# Patient Record
Sex: MALE | URBAN - METROPOLITAN AREA
[De-identification: names, ages, dates, MRNs, and addresses within clinical notes are randomized per-mention and may not be internally consistent; named-entity substitution may affect disease eponyms.]

---

## 2023-05-30 ENCOUNTER — APPOINTMENT (RX ONLY)
Dept: URBAN - METROPOLITAN AREA CLINIC 123 | Facility: CLINIC | Age: 43
Setting detail: DERMATOLOGY
End: 2023-05-30

## 2023-05-30 DIAGNOSIS — L82.1 OTHER SEBORRHEIC KERATOSIS: ICD-10-CM | Status: STABLE

## 2023-05-30 DIAGNOSIS — D17 BENIGN LIPOMATOUS NEOPLASM: ICD-10-CM | Status: INADEQUATELY CONTROLLED

## 2023-05-30 PROBLEM — D17.1 BENIGN LIPOMATOUS NEOPLASM OF SKIN AND SUBCUTANEOUS TISSUE OF TRUNK: Status: ACTIVE | Noted: 2023-05-30

## 2023-05-30 PROCEDURE — ? ADDITIONAL NOTES

## 2023-05-30 PROCEDURE — ? DEFER

## 2023-05-30 PROCEDURE — ? COUNSELING

## 2023-05-30 PROCEDURE — 99203 OFFICE O/P NEW LOW 30 MIN: CPT

## 2023-05-30 ASSESSMENT — LOCATION DETAILED DESCRIPTION DERM
LOCATION DETAILED: RIGHT LATERAL ABDOMEN
LOCATION DETAILED: RIGHT RIB CAGE

## 2023-05-30 ASSESSMENT — LOCATION ZONE DERM: LOCATION ZONE: TRUNK

## 2023-05-30 ASSESSMENT — LOCATION SIMPLE DESCRIPTION DERM: LOCATION SIMPLE: ABDOMEN

## 2023-05-30 NOTE — PROCEDURE: ADDITIONAL NOTES
Detail Level: Detailed
Additional Notes: Pt would like to pursue excision and will schedule 45 min appointment on my calendar at a future date.
Render Risk Assessment In Note?: no

## 2023-06-08 ENCOUNTER — APPOINTMENT (RX ONLY)
Dept: URBAN - METROPOLITAN AREA CLINIC 123 | Facility: CLINIC | Age: 43
Setting detail: DERMATOLOGY
End: 2023-06-08

## 2023-06-08 DIAGNOSIS — D17 BENIGN LIPOMATOUS NEOPLASM: ICD-10-CM

## 2023-06-08 PROBLEM — D48.5 NEOPLASM OF UNCERTAIN BEHAVIOR OF SKIN: Status: ACTIVE | Noted: 2023-06-08

## 2023-06-08 PROCEDURE — ? TREATMENT REGIMEN

## 2023-06-08 PROCEDURE — ? ADDITIONAL NOTES

## 2023-06-08 PROCEDURE — 11406 EXC TR-EXT B9+MARG >4.0 CM: CPT

## 2023-06-08 PROCEDURE — ? PRESCRIPTION

## 2023-06-08 PROCEDURE — 12032 INTMD RPR S/A/T/EXT 2.6-7.5: CPT

## 2023-06-08 PROCEDURE — ? EXCISION

## 2023-06-08 PROCEDURE — ? COUNSELING

## 2023-06-08 RX ORDER — DOXYCYCLINE HYCLATE 100 MG/1
CAPSULE, GELATIN COATED ORAL
Qty: 10 | Refills: 0 | Status: ERX | COMMUNITY
Start: 2023-06-08

## 2023-06-08 RX ADMIN — DOXYCYCLINE HYCLATE: 100 CAPSULE, GELATIN COATED ORAL at 00:00

## 2023-06-08 ASSESSMENT — LOCATION SIMPLE DESCRIPTION DERM: LOCATION SIMPLE: ABDOMEN

## 2023-06-08 ASSESSMENT — LOCATION ZONE DERM: LOCATION ZONE: TRUNK

## 2023-06-08 ASSESSMENT — LOCATION DETAILED DESCRIPTION DERM: LOCATION DETAILED: RIGHT LATERAL ABDOMEN

## 2023-06-08 NOTE — PROCEDURE: ADDITIONAL NOTES
Render Risk Assessment In Note?: no
Additional Notes: Upon removing the initially excised tissue, there was separation of the specimen from a deeper, somewhat distinct subcutaneous mass  that was clinically consistent with a lipoma.  Due to size, both this deeper tissue and the superficial tissue could not fit inside the same bottle, and therefore were sent in two bottles with the same label, as the site / diagnosis / ddx was the same.  \\n\\nPatient counseled on pain control with acetaminophen and ibuprofen.  Deeper layers did seem more fixed to muscular fascia so in case of recurrence, next excision may need to be done by Plastic / General Surgery under anesthesia but intent was to remove most / all today.\\n\\nPatient contacted later in the day / evening to check in and had started on abx for prophylaxis against infection and pain controlled with Tylenol.  He was doing well and denied questions or concerns.
Detail Level: Detailed

## 2023-06-08 NOTE — PROCEDURE: EXCISION

## 2023-06-09 ENCOUNTER — RX ONLY (OUTPATIENT)
Age: 43
Setting detail: RX ONLY
End: 2023-06-09

## 2023-06-09 ENCOUNTER — APPOINTMENT (RX ONLY)
Dept: URBAN - METROPOLITAN AREA CLINIC 123 | Facility: CLINIC | Age: 43
Setting detail: DERMATOLOGY
End: 2023-06-09

## 2023-06-09 DIAGNOSIS — Z48.817 ENCOUNTER FOR SURGICAL AFTERCARE FOLLOWING SURGERY ON THE SKIN AND SUBCUTANEOUS TISSUE: ICD-10-CM

## 2023-06-09 PROCEDURE — ? POST-OP WOUND CHECK

## 2023-06-09 PROCEDURE — ? ADDITIONAL NOTES

## 2023-06-09 RX ORDER — TRAMADOL HYDROCHLORIDE 50 MG/1
TABLET, FILM COATED ORAL
Qty: 4 | Refills: 0 | COMMUNITY
Start: 2023-06-09

## 2023-06-09 ASSESSMENT — LOCATION DETAILED DESCRIPTION DERM: LOCATION DETAILED: RIGHT LATERAL ABDOMEN

## 2023-06-09 ASSESSMENT — LOCATION SIMPLE DESCRIPTION DERM: LOCATION SIMPLE: ABDOMEN

## 2023-06-09 ASSESSMENT — LOCATION ZONE DERM: LOCATION ZONE: TRUNK

## 2023-06-09 NOTE — PROCEDURE: POST-OP WOUND CHECK
Detail Level: Detailed
Add 54953 Cpt? (Important Note: In 2017 The Use Of 58085 Is Being Tracked By Cms To Determine Future Global Period Reimbursement For Global Periods): no

## 2023-06-09 NOTE — PROCEDURE: ADDITIONAL NOTES
Detail Level: Detailed
Additional Notes: Pt presents for wound check to exclude hematoma after phone conversation earlier today.  No evidence on exam of active or recent hematoma (no expansion, bleeding, elevation), including after anesthetizing the lateral portion of the surgical site with plain lidocaine (no epi) and removing several top sutures, then exploring the wound - pressure resulted in no bleeding, no clot was appreciated, and aside from ecchymoses, wound otherwise healing as expected.\\n\\nPatient and his girlfriend reassured.  They were counseled on signs of hematoma (expanding / raised bruising, bleeding, throbbing, increasing pain) and to proceed to ED should any arise for immediate drainage.\\n\\nThe patient is already on doxycycline 100 mg bid for 5 days.\\n\\nTop sutures removed but no SQ sutures needed to be removed; therefore, steri-strips placed but no re-suturing required.  Pressure bandage placed, and extra medical tape provided.\\n\\nPatient has been in discomfort due to size of excision - offered tramadol 25 mg q6h (4 50 mg tablets)- he is aware not to take with other opioid/narcotics/benzos and not to drive or operate heavy machinery after taking.  He will continue scheduled acetaminophen as well but continue to avoid NSAIDS.\\n\\Alicia questions answered.  They appreciated the visit.\\n\\nPhotos uploaded to chart.
Render Risk Assessment In Note?: no

## 2023-06-14 ENCOUNTER — APPOINTMENT (RX ONLY)
Dept: URBAN - METROPOLITAN AREA CLINIC 123 | Facility: CLINIC | Age: 43
Setting detail: DERMATOLOGY
End: 2023-06-14

## 2023-06-14 DIAGNOSIS — Z48.817 ENCOUNTER FOR SURGICAL AFTERCARE FOLLOWING SURGERY ON THE SKIN AND SUBCUTANEOUS TISSUE: ICD-10-CM

## 2023-06-14 PROCEDURE — ? POST-OP WOUND CHECK

## 2023-06-14 PROCEDURE — ? PRESCRIPTION

## 2023-06-14 PROCEDURE — ? ADDITIONAL NOTES

## 2023-06-14 RX ORDER — DOXYCYCLINE HYCLATE 100 MG/1
CAPSULE, GELATIN COATED ORAL
Qty: 14 | Refills: 0 | Status: ERX

## 2023-06-14 ASSESSMENT — LOCATION SIMPLE DESCRIPTION DERM: LOCATION SIMPLE: ABDOMEN

## 2023-06-14 ASSESSMENT — LOCATION ZONE DERM: LOCATION ZONE: TRUNK

## 2023-06-14 ASSESSMENT — LOCATION DETAILED DESCRIPTION DERM: LOCATION DETAILED: RIGHT LATERAL ABDOMEN

## 2023-06-14 NOTE — PROCEDURE: ADDITIONAL NOTES
Detail Level: Detailed
Additional Notes: Hematoma evaluated in office today.  As detailed above, patient seen in ED to clear for outpatient evacuation.\\n\\nSurgical site anesthetized with plain lidocaine.  Superficial and deep sutures removed.  Extensive clot appreciated without active bleeding (consistent with CT this morning).  Minimal cautery required.  Clot evacuated and cavity rinsed with sterile saline.  Wound resutured with 2-0 PDS, 3-0 PDS, and 4-0 Vicryl.  Due to patient pain despite approaching but not reaching maximum dose of plain lidocaine, superficial sutures not placed but steri-streps were densely placed along wound bed.  Patient was in discomfort during procedure despite amounts of adequate local injection with plain lidocaine - patient's girlfriend reported ED provider advised the evacuation should be done by performing surgeon, and I advised that this is true for most hematomas but given the size of the lipoma removed and patient discomfort, further treatment if hematoma recurs should be done in the ED in order to minimize patient pain.  They expressed understanding and agreement.\\n\\nPrior to proceeding, patient provided verbal consent to hematoma / resuturing, including but not limited to risks for hematoma reformation, recurrent bleeding, infection, and pain.\\n\\nHe finished 5 day course of doxycycline yesterday, so we will extend another 7 days to reduce risk of infection given re-suturing performed.  Side effects reviewed once again.\\n\\nPatient complained of pain and was given two doses of acetaminophen 500 mg (no doses of acetaminophen thus far today and no history of hepatic impairment) provided by nursing staff from a personal stock as there was no acetaminophen available in our office supplies.  The patient and his girlfriend elected to take this medication due to his discomfort and are aware of the risks given we cannot verify the medication, but it was labeled as acetaminophen 500 mg on both tablets.  He will continue scheduled acetaminophen per package instructions at home not exceeding maximum dose.  He has tramadol to take as needed for breakthrough pain.\\n\\nIf ecchymoses spread, wound swells, or pain increases, as well as for any bleeding, drainage, fevers, or other signs of worsening or systemic symptoms, I once again emphasized they should proceed to the ED immediately.  They were given the number of our on-call nurse.  They have my mobile phone number and email address due to today's events (see notes) but are aware these are NOT monitored continuously and should NOT be used in emergent situations.  \\n\\nWound check advised tomorrow or this Friday.  They elected to send in photos tomorrow and to come for a wound check Friday afternoon.  Again, they will reach out for concerns prior and go to ED for recurrence in meantime.
Render Risk Assessment In Note?: no

## 2023-06-14 NOTE — PROCEDURE: POST-OP WOUND CHECK
Detail Level: Detailed
Add 68659 Cpt? (Important Note: In 2017 The Use Of 89146 Is Being Tracked By Cms To Determine Future Global Period Reimbursement For Global Periods): no

## 2023-06-16 ENCOUNTER — APPOINTMENT (RX ONLY)
Dept: URBAN - METROPOLITAN AREA CLINIC 123 | Facility: CLINIC | Age: 43
Setting detail: DERMATOLOGY
End: 2023-06-16

## 2023-06-16 DIAGNOSIS — Z48.817 ENCOUNTER FOR SURGICAL AFTERCARE FOLLOWING SURGERY ON THE SKIN AND SUBCUTANEOUS TISSUE: ICD-10-CM

## 2023-06-16 PROCEDURE — ? ADDITIONAL NOTES

## 2023-06-16 PROCEDURE — ? POST-OP WOUND CHECK

## 2023-06-16 ASSESSMENT — LOCATION ZONE DERM: LOCATION ZONE: TRUNK

## 2023-06-16 ASSESSMENT — LOCATION DETAILED DESCRIPTION DERM: LOCATION DETAILED: RIGHT LATERAL ABDOMEN

## 2023-06-16 ASSESSMENT — LOCATION SIMPLE DESCRIPTION DERM: LOCATION SIMPLE: ABDOMEN

## 2023-06-16 NOTE — PROCEDURE: ADDITIONAL NOTES
Detail Level: Detailed
Additional Notes: Hematoma evaluated in office two day prior.  \\n\\nThere appears to be recurrence of soft tissue edema.  There is no active drainage / bleeding or worsening / progression of ecchymoses.  Hematoma could be reforming versus seroma versus anticipated edema following excision.\\n\\nWe discussed the best option is to reopen the wound and explore to determine if there is active bleeding and/or a clot to evacuate.  The patient declined.\\n\\nI offered drainage through a sterile needle after local anesthesia achieved.  The patient selected this option.  \\n\\nSurgical site anesthetized with lidocaine with epinephrine at dependent portion while supine.  Three attempts at aspiration were unsuccessful.  Therefore, this is likely soft tissue edema and less likely organized clot.  The patient politely refused repeat evacuation (despite being strongly encouraged by myself and the patient's significant other, in order to explore the area, prevent bleeding, drain the clot, and avoid infection / pain) and elected for non-surgical management with pressure bandaging and close follow-up / observation.  They are aware the ED would be able to provide greater levels of pain control during these procedures if needed.  \\n\\nPressure bandage replaced.  They will change in 24 hours and ice before replacing.  They will keep pressure bandage on aside from icing and cleansing wound.  He will avoid physical activity and NSAIDs/other blood thinners.  He is not currently in pain but will take acetaminophen as needed for pain with tramadol for breakthrough.  They will send me daily photos during dressing changes and call our emergency line for worsening.  They are aware to proceed to the ED if rapidly worsening as well.  \\n\\nThe patient will continue oral antibiotics doxycycline 100 mg bid for an additional 7 days (12 total) for prophylaxis against infection.\\n\\nPrior to proceeding, patient provided verbal consent to procedure.  \\n\\Alicia questions answered.
Render Risk Assessment In Note?: no

## 2023-06-16 NOTE — PROCEDURE: POST-OP WOUND CHECK
Detail Level: Detailed
Add 09810 Cpt? (Important Note: In 2017 The Use Of 59597 Is Being Tracked By Cms To Determine Future Global Period Reimbursement For Global Periods): no

## 2023-06-20 ENCOUNTER — APPOINTMENT (RX ONLY)
Dept: URBAN - METROPOLITAN AREA CLINIC 123 | Facility: CLINIC | Age: 43
Setting detail: DERMATOLOGY
End: 2023-06-20

## 2023-06-20 DIAGNOSIS — Z48.817 ENCOUNTER FOR SURGICAL AFTERCARE FOLLOWING SURGERY ON THE SKIN AND SUBCUTANEOUS TISSUE: ICD-10-CM

## 2023-06-20 PROCEDURE — ? PRESCRIPTION

## 2023-06-20 PROCEDURE — ? POST-OP WOUND CHECK

## 2023-06-20 PROCEDURE — ? ADDITIONAL NOTES

## 2023-06-20 PROCEDURE — ? COUNSELING

## 2023-06-20 RX ORDER — DOXYCYCLINE HYCLATE 100 MG/1
TABLET, COATED ORAL
Qty: 10 | Refills: 0 | Status: ERX | COMMUNITY
Start: 2023-06-20

## 2023-06-20 RX ADMIN — DOXYCYCLINE HYCLATE: 100 TABLET, COATED ORAL at 00:00

## 2023-06-20 ASSESSMENT — LOCATION ZONE DERM: LOCATION ZONE: TRUNK

## 2023-06-20 ASSESSMENT — LOCATION DETAILED DESCRIPTION DERM: LOCATION DETAILED: RIGHT LATERAL ABDOMEN

## 2023-06-20 ASSESSMENT — LOCATION SIMPLE DESCRIPTION DERM: LOCATION SIMPLE: ABDOMEN

## 2023-06-20 NOTE — PROCEDURE: POST-OP WOUND CHECK
Detail Level: Detailed
Add 49175 Cpt? (Important Note: In 2017 The Use Of 60412 Is Being Tracked By Cms To Determine Future Global Period Reimbursement For Global Periods): no

## 2023-06-20 NOTE — PROCEDURE: ADDITIONAL NOTES
Additional Notes: There is no evidence for hematoma or active bleeding on exam today.  There is stable soft tissue edema, likely due to recent excision and subsequent hematoma evacuation and re-suturing.  The patient will return in one week for in-person follow-up and continue to send photos ever 2 days or so.  I called him over the weekend as well as yesterday and today, and he reports he is continuing to do well.  I encouraged him to continue to avoid physical exertion, to continue icing, and to continue with pressure dressings.  All questions answered.  He is aware to reach out for questions or concerns.
Render Risk Assessment In Note?: no
Detail Level: Detailed

## 2023-06-30 ENCOUNTER — APPOINTMENT (RX ONLY)
Dept: URBAN - METROPOLITAN AREA CLINIC 123 | Facility: CLINIC | Age: 43
Setting detail: DERMATOLOGY
End: 2023-06-30

## 2023-06-30 DIAGNOSIS — Z48.817 ENCOUNTER FOR SURGICAL AFTERCARE FOLLOWING SURGERY ON THE SKIN AND SUBCUTANEOUS TISSUE: ICD-10-CM | Status: IMPROVED

## 2023-06-30 PROCEDURE — ? POST-OP WOUND CHECK

## 2023-06-30 PROCEDURE — ? COUNSELING

## 2023-06-30 PROCEDURE — ? ADDITIONAL NOTES

## 2023-06-30 PROCEDURE — ? PRESCRIPTION

## 2023-06-30 RX ORDER — MUPIROCIN 20 MG/G
OINTMENT TOPICAL
Qty: 22 | Refills: 11 | Status: ERX | COMMUNITY
Start: 2023-06-30

## 2023-06-30 RX ADMIN — MUPIROCIN: 20 OINTMENT TOPICAL at 00:00

## 2023-06-30 ASSESSMENT — LOCATION DETAILED DESCRIPTION DERM: LOCATION DETAILED: RIGHT LATERAL ABDOMEN

## 2023-06-30 ASSESSMENT — LOCATION ZONE DERM: LOCATION ZONE: TRUNK

## 2023-06-30 ASSESSMENT — LOCATION SIMPLE DESCRIPTION DERM: LOCATION SIMPLE: ABDOMEN

## 2023-06-30 NOTE — PROCEDURE: POST-OP WOUND CHECK
Detail Level: Detailed
Add 53828 Cpt? (Important Note: In 2017 The Use Of 31451 Is Being Tracked By Cms To Determine Future Global Period Reimbursement For Global Periods): no

## 2023-06-30 NOTE — PROCEDURE: ADDITIONAL NOTES
Render Risk Assessment In Note?: no
Additional Notes: Wound swelling significantly improved.  There is minimal dehiscence at lateral border but otherwise healing well.  I advised mupirocin ointment 2-3 times daily after cleansing with warm soapy water to this aspect and remainder of wound to prevent infection. He is no longer on oral abx and no signs of infxn today but these were reviewed, and they will reach out should they develop.  \\n\\nThey will send updated photos of the wound to me next week and the week after.  They will return for in-person follow-up (difficult now due to work) if needed.\\n\\nPatient will reach out for questions or concerns.
Detail Level: Detailed